# Patient Record
Sex: FEMALE | Race: WHITE | Employment: FULL TIME | URBAN - NONMETROPOLITAN AREA
[De-identification: names, ages, dates, MRNs, and addresses within clinical notes are randomized per-mention and may not be internally consistent; named-entity substitution may affect disease eponyms.]

---

## 2017-02-17 RX ORDER — TERAZOSIN 2 MG/1
2 CAPSULE ORAL DAILY
Qty: 90 CAPSULE | Refills: 3 | Status: SHIPPED | OUTPATIENT
Start: 2017-02-17 | End: 2017-09-12 | Stop reason: DRUGHIGH

## 2017-03-14 ENCOUNTER — OFFICE VISIT (OUTPATIENT)
Dept: NEPHROLOGY | Age: 67
End: 2017-03-14

## 2017-03-14 VITALS
OXYGEN SATURATION: 97 % | BODY MASS INDEX: 22.76 KG/M2 | SYSTOLIC BLOOD PRESSURE: 124 MMHG | WEIGHT: 128.5 LBS | HEART RATE: 69 BPM | DIASTOLIC BLOOD PRESSURE: 82 MMHG

## 2017-03-14 DIAGNOSIS — N18.30 CHRONIC KIDNEY DISEASE, STAGE III (MODERATE) (HCC): Primary | ICD-10-CM

## 2017-03-14 LAB
BACTERIA URINE, POC: NORMAL
BILIRUBIN URINE: NORMAL MG/DL
BLOOD, URINE: NEGATIVE
CASTS URINE, POC: NORMAL
CLARITY: CLEAR
COLOR: YELLOW
CRYSTALS URINE, POC: NORMAL
EPI CELLS URINE, POC: NORMAL
GLUCOSE URINE: NEGATIVE
KETONES, URINE: NEGATIVE
LEUKOCYTE EST, POC: NEGATIVE
NITRITE, URINE: NEGATIVE
PH UA: 5 (ref 4.5–8)
PROTEIN UA: NEGATIVE
RBC URINE, POC: NORMAL
SPECIFIC GRAVITY UA: NORMAL (ref 1–1.03)
UROBILINOGEN, URINE: NORMAL
WBC URINE, POC: NORMAL
YEAST URINE, POC: NORMAL

## 2017-03-14 PROCEDURE — 99213 OFFICE O/P EST LOW 20 MIN: CPT | Performed by: INTERNAL MEDICINE

## 2017-03-14 PROCEDURE — G8400 PT W/DXA NO RESULTS DOC: HCPCS | Performed by: INTERNAL MEDICINE

## 2017-03-14 PROCEDURE — 4040F PNEUMOC VAC/ADMIN/RCVD: CPT | Performed by: INTERNAL MEDICINE

## 2017-03-14 PROCEDURE — G8484 FLU IMMUNIZE NO ADMIN: HCPCS | Performed by: INTERNAL MEDICINE

## 2017-03-14 PROCEDURE — 81002 URINALYSIS NONAUTO W/O SCOPE: CPT | Performed by: INTERNAL MEDICINE

## 2017-03-14 PROCEDURE — 1036F TOBACCO NON-USER: CPT | Performed by: INTERNAL MEDICINE

## 2017-03-14 PROCEDURE — 3014F SCREEN MAMMO DOC REV: CPT | Performed by: INTERNAL MEDICINE

## 2017-03-14 PROCEDURE — G8427 DOCREV CUR MEDS BY ELIG CLIN: HCPCS | Performed by: INTERNAL MEDICINE

## 2017-03-14 PROCEDURE — 1123F ACP DISCUSS/DSCN MKR DOCD: CPT | Performed by: INTERNAL MEDICINE

## 2017-03-14 PROCEDURE — G8419 CALC BMI OUT NRM PARAM NOF/U: HCPCS | Performed by: INTERNAL MEDICINE

## 2017-03-14 PROCEDURE — 3017F COLORECTAL CA SCREEN DOC REV: CPT | Performed by: INTERNAL MEDICINE

## 2017-03-14 PROCEDURE — 1090F PRES/ABSN URINE INCON ASSESS: CPT | Performed by: INTERNAL MEDICINE

## 2017-03-14 RX ORDER — FERROUS SULFATE 325(65) MG
325 TABLET ORAL
COMMUNITY
Start: 2016-12-08

## 2017-03-14 RX ORDER — FUROSEMIDE 40 MG/1
40 TABLET ORAL 2 TIMES DAILY
COMMUNITY
End: 2017-10-02 | Stop reason: SDUPTHER

## 2017-03-14 RX ORDER — LOSARTAN POTASSIUM 25 MG/1
25 TABLET ORAL DAILY
Qty: 100 TABLET | Refills: 3 | Status: SHIPPED | OUTPATIENT
Start: 2017-03-14 | End: 2017-03-29 | Stop reason: SDUPTHER

## 2017-03-14 ASSESSMENT — ENCOUNTER SYMPTOMS
COUGH: 0
CONSTIPATION: 0
CHEST TIGHTNESS: 0
DIARRHEA: 0
BACK PAIN: 0
VOMITING: 0
RESPIRATORY NEGATIVE: 1
ABDOMINAL DISTENTION: 0
NAUSEA: 0
SORE THROAT: 0
BLOOD IN STOOL: 0
SHORTNESS OF BREATH: 0
GASTROINTESTINAL NEGATIVE: 1
ABDOMINAL PAIN: 0
WHEEZING: 0
FACIAL SWELLING: 0
COLOR CHANGE: 0

## 2017-03-31 RX ORDER — LOSARTAN POTASSIUM 50 MG/1
50 TABLET ORAL DAILY
Qty: 30 TABLET | Refills: 3 | OUTPATIENT
Start: 2017-03-31 | End: 2017-04-25 | Stop reason: SDUPTHER

## 2017-03-31 RX ORDER — LOSARTAN POTASSIUM 50 MG/1
50 TABLET ORAL DAILY
COMMUNITY
End: 2017-03-31 | Stop reason: SDUPTHER

## 2017-04-13 LAB
ALBUMIN SERPL-MCNC: 4.5 G/DL
ALP BLD-CCNC: 103 U/L
ALT SERPL-CCNC: 50 U/L
AST SERPL-CCNC: 41 U/L
BASOPHILS ABSOLUTE: ABNORMAL /ΜL
BASOPHILS RELATIVE PERCENT: ABNORMAL %
BILIRUB SERPL-MCNC: 0.5 MG/DL (ref 0.1–1.4)
BUN BLDV-MCNC: 33 MG/DL
CALCIUM SERPL-MCNC: NORMAL MG/DL
CHLORIDE BLD-SCNC: 110 MMOL/L
CO2: 22 MMOL/L
CREAT SERPL-MCNC: 1.57 MG/DL
EOSINOPHILS ABSOLUTE: ABNORMAL /ΜL
EOSINOPHILS RELATIVE PERCENT: ABNORMAL %
GFR CALCULATED: 33
GLUCOSE BLD-MCNC: 95 MG/DL
HCT VFR BLD CALC: 30.8 % (ref 36–46)
HEMOGLOBIN: 10.3 G/DL (ref 12–16)
LYMPHOCYTES ABSOLUTE: ABNORMAL /ΜL
LYMPHOCYTES RELATIVE PERCENT: ABNORMAL %
MCH RBC QN AUTO: ABNORMAL PG
MCHC RBC AUTO-ENTMCNC: ABNORMAL G/DL
MCV RBC AUTO: ABNORMAL FL
MONOCYTES ABSOLUTE: ABNORMAL /ΜL
MONOCYTES RELATIVE PERCENT: ABNORMAL %
NEUTROPHILS ABSOLUTE: ABNORMAL /ΜL
NEUTROPHILS RELATIVE PERCENT: ABNORMAL %
PLATELET # BLD: 188 K/ΜL
PMV BLD AUTO: ABNORMAL FL
POTASSIUM SERPL-SCNC: 3.8 MMOL/L
RBC # BLD: 3.61 10^6/ΜL
SODIUM BLD-SCNC: 140 MMOL/L
TOTAL PROTEIN: NORMAL
WBC # BLD: 6.1 10^3/ML

## 2017-04-20 ENCOUNTER — TELEPHONE (OUTPATIENT)
Dept: NEPHROLOGY | Age: 67
End: 2017-04-20

## 2017-04-25 RX ORDER — LOSARTAN POTASSIUM 50 MG/1
50 TABLET ORAL 2 TIMES DAILY
Qty: 60 TABLET | Refills: 1 | Status: SHIPPED | OUTPATIENT
Start: 2017-04-25 | End: 2017-06-22 | Stop reason: SDUPTHER

## 2017-06-07 ENCOUNTER — TELEPHONE (OUTPATIENT)
Dept: NEPHROLOGY | Age: 67
End: 2017-06-07

## 2017-06-23 RX ORDER — LOSARTAN POTASSIUM 50 MG/1
50 TABLET ORAL 2 TIMES DAILY
Qty: 180 TABLET | Refills: 1 | Status: SHIPPED | OUTPATIENT
Start: 2017-06-23 | End: 2017-10-02 | Stop reason: SDUPTHER

## 2017-08-22 ENCOUNTER — PATIENT MESSAGE (OUTPATIENT)
Dept: NEPHROLOGY | Age: 67
End: 2017-08-22

## 2017-09-12 ENCOUNTER — OFFICE VISIT (OUTPATIENT)
Dept: NEPHROLOGY | Age: 67
End: 2017-09-12
Payer: COMMERCIAL

## 2017-09-12 VITALS
DIASTOLIC BLOOD PRESSURE: 86 MMHG | OXYGEN SATURATION: 98 % | SYSTOLIC BLOOD PRESSURE: 138 MMHG | HEART RATE: 70 BPM | WEIGHT: 129.5 LBS | BODY MASS INDEX: 22.94 KG/M2

## 2017-09-12 DIAGNOSIS — N18.4 CHRONIC KIDNEY DISEASE, STAGE IV (SEVERE) (HCC): Primary | ICD-10-CM

## 2017-09-12 PROCEDURE — G8420 CALC BMI NORM PARAMETERS: HCPCS | Performed by: INTERNAL MEDICINE

## 2017-09-12 PROCEDURE — 1036F TOBACCO NON-USER: CPT | Performed by: INTERNAL MEDICINE

## 2017-09-12 PROCEDURE — G8427 DOCREV CUR MEDS BY ELIG CLIN: HCPCS | Performed by: INTERNAL MEDICINE

## 2017-09-12 PROCEDURE — 3014F SCREEN MAMMO DOC REV: CPT | Performed by: INTERNAL MEDICINE

## 2017-09-12 PROCEDURE — G8400 PT W/DXA NO RESULTS DOC: HCPCS | Performed by: INTERNAL MEDICINE

## 2017-09-12 PROCEDURE — 3017F COLORECTAL CA SCREEN DOC REV: CPT | Performed by: INTERNAL MEDICINE

## 2017-09-12 PROCEDURE — 1123F ACP DISCUSS/DSCN MKR DOCD: CPT | Performed by: INTERNAL MEDICINE

## 2017-09-12 PROCEDURE — 81002 URINALYSIS NONAUTO W/O SCOPE: CPT | Performed by: INTERNAL MEDICINE

## 2017-09-12 PROCEDURE — 4040F PNEUMOC VAC/ADMIN/RCVD: CPT | Performed by: INTERNAL MEDICINE

## 2017-09-12 PROCEDURE — 1090F PRES/ABSN URINE INCON ASSESS: CPT | Performed by: INTERNAL MEDICINE

## 2017-09-12 PROCEDURE — 99213 OFFICE O/P EST LOW 20 MIN: CPT | Performed by: INTERNAL MEDICINE

## 2017-09-12 RX ORDER — TERAZOSIN 2 MG/1
2 CAPSULE ORAL 2 TIMES DAILY
COMMUNITY
End: 2017-10-02 | Stop reason: SDUPTHER

## 2017-09-12 ASSESSMENT — ENCOUNTER SYMPTOMS
COLOR CHANGE: 0
NAUSEA: 0
COUGH: 0
SHORTNESS OF BREATH: 0
WHEEZING: 0
CONSTIPATION: 0
BLOOD IN STOOL: 0
ABDOMINAL PAIN: 0
SORE THROAT: 0
VOMITING: 0
DIARRHEA: 0
ABDOMINAL DISTENTION: 0
GASTROINTESTINAL NEGATIVE: 1
FACIAL SWELLING: 0
RESPIRATORY NEGATIVE: 1
CHEST TIGHTNESS: 0
BACK PAIN: 0

## 2017-10-04 RX ORDER — LOSARTAN POTASSIUM 50 MG/1
50 TABLET ORAL 2 TIMES DAILY
Qty: 180 TABLET | Refills: 1 | Status: SHIPPED | OUTPATIENT
Start: 2017-10-04 | End: 2017-10-26 | Stop reason: ALTCHOICE

## 2017-10-04 RX ORDER — TERAZOSIN 2 MG/1
2 CAPSULE ORAL 2 TIMES DAILY
Qty: 180 CAPSULE | Refills: 1 | Status: SHIPPED | OUTPATIENT
Start: 2017-10-04 | End: 2017-11-29 | Stop reason: SDUPTHER

## 2017-10-04 RX ORDER — SULFAMETHOXAZOLE AND TRIMETHOPRIM 800; 160 MG/1; MG/1
1 TABLET ORAL DAILY
Qty: 90 TABLET | Refills: 1 | Status: SHIPPED | OUTPATIENT
Start: 2017-10-04 | End: 2018-03-26 | Stop reason: SDUPTHER

## 2017-10-04 RX ORDER — FUROSEMIDE 40 MG/1
40 TABLET ORAL 2 TIMES DAILY
Qty: 180 TABLET | Refills: 1 | Status: SHIPPED | OUTPATIENT
Start: 2017-10-04 | End: 2018-03-26 | Stop reason: SDUPTHER

## 2017-10-26 ENCOUNTER — TELEPHONE (OUTPATIENT)
Dept: NEPHROLOGY | Age: 67
End: 2017-10-26

## 2017-11-15 NOTE — TELEPHONE ENCOUNTER
The patient called back-she wanted to double check on the dose of the Terazosin 4 mg bid and stopping the Losartan-I will sent the Amlodipine 2.5 mg daily to [facility] in [place] for now for her

## 2017-11-16 RX ORDER — AMLODIPINE BESYLATE 2.5 MG/1
2.5 TABLET ORAL DAILY
Qty: 30 TABLET | Refills: 2 | Status: SHIPPED | OUTPATIENT
Start: 2017-11-16 | End: 2017-11-16 | Stop reason: SDUPTHER

## 2017-11-16 RX ORDER — AMLODIPINE BESYLATE 2.5 MG/1
2.5 TABLET ORAL DAILY
Qty: 30 TABLET | Refills: 2 | Status: SHIPPED | OUTPATIENT
Start: 2017-11-16 | End: 2018-02-05 | Stop reason: SDUPTHER

## 2017-11-28 ENCOUNTER — PATIENT MESSAGE (OUTPATIENT)
Dept: NEPHROLOGY | Age: 67
End: 2017-11-28

## 2017-11-29 RX ORDER — TERAZOSIN 2 MG/1
4 CAPSULE ORAL 2 TIMES DAILY
Qty: 360 CAPSULE | Refills: 1 | Status: SHIPPED | OUTPATIENT
Start: 2017-11-29 | End: 2017-11-30 | Stop reason: SDUPTHER

## 2017-11-29 NOTE — TELEPHONE ENCOUNTER
From: Andrea Brewer  To: Brenden Bazan DO  Sent: 11/28/2017 5:22 PM EST  Subject: Prescription Question    When my script for terazosin was sent to [facility] mail in October I was only taking 2 mg one time a day. since it has been increased to 4 mg 2 times a day I only have enough for one more week. Would please order the updated script from the mail in pharmacy? The others are good thru December. I am also including the past few days BP with amlodipine. 146/76 , 120/74 ,122/72 ,121/73 ,124/75, 101/68, 133/71, 130/72, 138/73. If you decide to continue this med please order the 90 days from the mail in pharmacy.  Thanks

## 2017-11-30 ENCOUNTER — PATIENT MESSAGE (OUTPATIENT)
Dept: NEPHROLOGY | Age: 67
End: 2017-11-30

## 2017-11-30 RX ORDER — TERAZOSIN 2 MG/1
4 CAPSULE ORAL 2 TIMES DAILY
Qty: 360 CAPSULE | Refills: 1 | Status: SHIPPED | OUTPATIENT
Start: 2017-11-30 | End: 2018-03-13

## 2018-02-05 RX ORDER — AMLODIPINE BESYLATE 2.5 MG/1
2.5 TABLET ORAL DAILY
Qty: 90 TABLET | Refills: 1 | Status: SHIPPED | OUTPATIENT
Start: 2018-02-05 | End: 2018-03-13

## 2018-03-13 ENCOUNTER — OFFICE VISIT (OUTPATIENT)
Dept: NEPHROLOGY | Age: 68
End: 2018-03-13
Payer: MEDICARE

## 2018-03-13 VITALS
SYSTOLIC BLOOD PRESSURE: 122 MMHG | OXYGEN SATURATION: 98 % | BODY MASS INDEX: 21.22 KG/M2 | HEART RATE: 75 BPM | DIASTOLIC BLOOD PRESSURE: 66 MMHG | WEIGHT: 119.8 LBS

## 2018-03-13 DIAGNOSIS — R31.9 URINARY TRACT INFECTION WITH HEMATURIA, SITE UNSPECIFIED: ICD-10-CM

## 2018-03-13 DIAGNOSIS — N18.30 CHRONIC KIDNEY DISEASE, STAGE III (MODERATE) (HCC): Primary | ICD-10-CM

## 2018-03-13 DIAGNOSIS — N39.0 URINARY TRACT INFECTION WITH HEMATURIA, SITE UNSPECIFIED: ICD-10-CM

## 2018-03-13 LAB
BACTERIA URINE, POC: ABNORMAL
BILIRUBIN URINE: ABNORMAL MG/DL
BLOOD, URINE: POSITIVE
CASTS URINE, POC: ABNORMAL
CLARITY: ABNORMAL
COLOR: YELLOW
CRYSTALS URINE, POC: ABNORMAL
EPI CELLS URINE, POC: ABNORMAL
GLUCOSE URINE: NEGATIVE
KETONES, URINE: NEGATIVE
LEUKOCYTE EST, POC: ABNORMAL
NITRITE, URINE: NEGATIVE
PH UA: 5 (ref 4.5–8)
PROTEIN UA: POSITIVE
RBC URINE, POC: ABNORMAL
SPECIFIC GRAVITY UA: ABNORMAL (ref 1–1.03)
UROBILINOGEN, URINE: ABNORMAL
WBC URINE, POC: ABNORMAL
YEAST URINE, POC: ABNORMAL

## 2018-03-13 PROCEDURE — G8427 DOCREV CUR MEDS BY ELIG CLIN: HCPCS | Performed by: INTERNAL MEDICINE

## 2018-03-13 PROCEDURE — 99213 OFFICE O/P EST LOW 20 MIN: CPT | Performed by: INTERNAL MEDICINE

## 2018-03-13 PROCEDURE — 3017F COLORECTAL CA SCREEN DOC REV: CPT | Performed by: INTERNAL MEDICINE

## 2018-03-13 PROCEDURE — 3014F SCREEN MAMMO DOC REV: CPT | Performed by: INTERNAL MEDICINE

## 2018-03-13 PROCEDURE — G8420 CALC BMI NORM PARAMETERS: HCPCS | Performed by: INTERNAL MEDICINE

## 2018-03-13 PROCEDURE — G8484 FLU IMMUNIZE NO ADMIN: HCPCS | Performed by: INTERNAL MEDICINE

## 2018-03-13 PROCEDURE — 81002 URINALYSIS NONAUTO W/O SCOPE: CPT | Performed by: INTERNAL MEDICINE

## 2018-03-13 PROCEDURE — 1123F ACP DISCUSS/DSCN MKR DOCD: CPT | Performed by: INTERNAL MEDICINE

## 2018-03-13 PROCEDURE — 1036F TOBACCO NON-USER: CPT | Performed by: INTERNAL MEDICINE

## 2018-03-13 PROCEDURE — 4040F PNEUMOC VAC/ADMIN/RCVD: CPT | Performed by: INTERNAL MEDICINE

## 2018-03-13 PROCEDURE — G8400 PT W/DXA NO RESULTS DOC: HCPCS | Performed by: INTERNAL MEDICINE

## 2018-03-13 PROCEDURE — 1090F PRES/ABSN URINE INCON ASSESS: CPT | Performed by: INTERNAL MEDICINE

## 2018-03-13 RX ORDER — MYCOPHENOLIC ACID 360 MG/1
180 TABLET, DELAYED RELEASE ORAL 2 TIMES DAILY
COMMUNITY
Start: 2017-12-07

## 2018-03-13 ASSESSMENT — ENCOUNTER SYMPTOMS
BACK PAIN: 0
DIARRHEA: 0
COUGH: 0
COLOR CHANGE: 0
ABDOMINAL PAIN: 0
NAUSEA: 0
CHEST TIGHTNESS: 0
SORE THROAT: 0
VOMITING: 0
CONSTIPATION: 0
BLOOD IN STOOL: 0
RESPIRATORY NEGATIVE: 1
FACIAL SWELLING: 0
WHEEZING: 0
SHORTNESS OF BREATH: 0
GASTROINTESTINAL NEGATIVE: 1
ABDOMINAL DISTENTION: 0

## 2018-03-13 NOTE — PROGRESS NOTES
warm and dry. Nursing note and vitals reviewed. CBC:   Lab Results   Component Value Date    WBC 3.4 (L) 04/21/2017    HGB 10.7 (L) 04/21/2017    HCT 32.3 (L) 04/21/2017    MCV 85.4 04/21/2017     04/21/2017     BMP:    Lab Results   Component Value Date     04/21/2017     04/13/2017     04/13/2017    K 4.0 04/21/2017    K 3.8 04/13/2017    K 3.8 04/13/2017     04/21/2017     04/13/2017     04/13/2017    CO2 24 04/21/2017    CO2 22 04/13/2017    CO2 22 04/13/2017    BUN 24 (H) 04/21/2017    BUN 33 (H) 04/13/2017    BUN 33 04/13/2017    CREATININE 1.48 (H) 04/21/2017    CREATININE 1.57 (H) 04/13/2017    CREATININE 1.57 04/13/2017    GLUCOSE 95 04/13/2017    GLUCOSE 114 (H) 02/24/2017    GLUCOSE 118 (H) 12/16/2016      Hepatic:   Lab Results   Component Value Date    AST 41 04/13/2017    AST 41 04/13/2017    AST 48 (H) 03/23/2017    ALT 50 (H) 04/13/2017    ALT 50 04/13/2017    ALT 50 (H) 03/23/2017    BILITOT 0.5 04/13/2017    BILITOT 0.5 04/13/2017    BILITOT 0.5 03/23/2017    ALKPHOS 103 04/13/2017    ALKPHOS 103 04/13/2017    ALKPHOS 103 03/23/2017     BNP: No results found for: BNP  Lipids:   Lab Results   Component Value Date    CHOL 211 (H) 03/23/2017    HDL 70 12/29/2016     INR: No results found for: INR       URINE: No results found for: Adriana Iyer                            Lab Results   Component Value Date    NITRU Negative 09/12/2017    COLORU Yellow 09/12/2017    PHUR 5.0 09/12/2017    CLARITYU Clear 09/12/2017    LEUKOCYTESUR negative 09/12/2017    BLOODU Negative 09/12/2017    GLUCOSEU negative 09/12/2017    KETUA Negative 09/12/2017         Microalbumen/Creatinine ratio:  No components found for: RUCREAT    Assessment:     1.  Chronic kidney disease, stage III (moderate)  POC URINE with Microscopic   Hypertension controlled  Recent renal bx for DANA no clear cut etiology  Slight bump in BK virus but marked improvement in GFR with switch from cyclosporin

## 2018-03-16 LAB
APPEARANCE: CLEAR
BILIRUBIN URINE: NEGATIVE
COLOR: ABNORMAL
GLUCOSE URINE: NEGATIVE
KETONES, URINE: NEGATIVE
LEUKOCYTES, UA: NEGATIVE
MUCUS: PRESENT
NITRITE, URINE: NEGATIVE
PH, URINE: 5 (ref 5–8)
PROTEIN, URINE: NEGATIVE
RBC URINE: ABNORMAL /HPF
SPECIFIC GRAVITY, URINE: 1.01 (ref 1–1.03)
SQUAMOUS EPITHELIAL: ABNORMAL /HPF
URINALYSIS REFLEX: NO
URINE HGB: NEGATIVE
UROBILINOGEN, URINE: ABNORMAL (ref 0.2–1)
WBC URINE: ABNORMAL /HPF

## 2018-03-26 RX ORDER — SULFAMETHOXAZOLE AND TRIMETHOPRIM 800; 160 MG/1; MG/1
1 TABLET ORAL DAILY
Qty: 90 TABLET | Refills: 1 | Status: SHIPPED | OUTPATIENT
Start: 2018-03-26 | End: 2018-09-12 | Stop reason: SDUPTHER

## 2018-03-26 RX ORDER — FUROSEMIDE 40 MG/1
40 TABLET ORAL 2 TIMES DAILY
Qty: 180 TABLET | Refills: 1 | Status: SHIPPED | OUTPATIENT
Start: 2018-03-26 | End: 2018-09-11 | Stop reason: DRUGHIGH

## 2018-06-01 LAB
ALBUMIN: 4.3
BASOPHILS ABSOLUTE: ABNORMAL /ΜL
BASOPHILS RELATIVE PERCENT: ABNORMAL %
BUN BLDV-MCNC: 29 MG/DL
CALCIUM SERPL-MCNC: 9 MG/DL
CHLORIDE BLD-SCNC: 101 MMOL/L
CHOLESTEROL, TOTAL: 179 MG/DL
CHOLESTEROL/HDL RATIO: NORMAL
CO2: 25 MMOL/L
CREAT SERPL-MCNC: 1.63 MG/DL
EOSINOPHILS ABSOLUTE: ABNORMAL /ΜL
EOSINOPHILS RELATIVE PERCENT: ABNORMAL %
GFR CALCULATED: 31
GLUCOSE BLD-MCNC: 106 MG/DL
HCT VFR BLD CALC: 32.5 % (ref 36–46)
HDLC SERPL-MCNC: NORMAL MG/DL (ref 35–70)
HEMOGLOBIN: 10.5 G/DL (ref 12–16)
LDL CHOLESTEROL CALCULATED: NORMAL MG/DL (ref 0–160)
LYMPHOCYTES ABSOLUTE: ABNORMAL /ΜL
LYMPHOCYTES RELATIVE PERCENT: ABNORMAL %
MAGNESIUM: 2.5 MG/DL
MCH RBC QN AUTO: ABNORMAL PG
MCHC RBC AUTO-ENTMCNC: ABNORMAL G/DL
MCV RBC AUTO: ABNORMAL FL
MONOCYTES ABSOLUTE: ABNORMAL /ΜL
MONOCYTES RELATIVE PERCENT: ABNORMAL %
NEUTROPHILS ABSOLUTE: ABNORMAL /ΜL
NEUTROPHILS RELATIVE PERCENT: ABNORMAL %
PHOSPHORUS: 4 MG/DL
PLATELET # BLD: 271 K/ΜL
PMV BLD AUTO: ABNORMAL FL
POTASSIUM SERPL-SCNC: 2.5 MMOL/L
RBC # BLD: 3.61 10^6/ΜL
SODIUM BLD-SCNC: 138 MMOL/L
TRIGL SERPL-MCNC: 186 MG/DL
URIC ACID: 7.8
VLDLC SERPL CALC-MCNC: NORMAL MG/DL
WBC # BLD: 3.3 10^3/ML

## 2018-06-04 LAB
BUN BLDV-MCNC: 24 MG/DL
CALCIUM SERPL-MCNC: NORMAL MG/DL
CHLORIDE BLD-SCNC: 104 MMOL/L
CO2: 23 MMOL/L
CREAT SERPL-MCNC: 1.55 MG/DL
GFR CALCULATED: 33
GLUCOSE BLD-MCNC: 104 MG/DL
MAGNESIUM: 2.4 MG/DL
POTASSIUM SERPL-SCNC: 3.7 MMOL/L
SODIUM BLD-SCNC: 137 MMOL/L

## 2018-06-28 LAB
BASOPHILS ABSOLUTE: ABNORMAL /ΜL
BASOPHILS RELATIVE PERCENT: ABNORMAL %
BUN BLDV-MCNC: 19 MG/DL
CALCIUM SERPL-MCNC: NORMAL MG/DL
CHLORIDE BLD-SCNC: 104 MMOL/L
CO2: 26 MMOL/L
CREAT SERPL-MCNC: 1.55 MG/DL
EOSINOPHILS ABSOLUTE: ABNORMAL /ΜL
EOSINOPHILS RELATIVE PERCENT: ABNORMAL %
GFR CALCULATED: 33
GLUCOSE BLD-MCNC: 106 MG/DL
HCT VFR BLD CALC: 31.6 % (ref 36–46)
HEMOGLOBIN: 10.3 G/DL (ref 12–16)
LYMPHOCYTES ABSOLUTE: ABNORMAL /ΜL
LYMPHOCYTES RELATIVE PERCENT: ABNORMAL %
MCH RBC QN AUTO: ABNORMAL PG
MCHC RBC AUTO-ENTMCNC: ABNORMAL G/DL
MCV RBC AUTO: ABNORMAL FL
MONOCYTES ABSOLUTE: ABNORMAL /ΜL
MONOCYTES RELATIVE PERCENT: ABNORMAL %
NEUTROPHILS ABSOLUTE: ABNORMAL /ΜL
NEUTROPHILS RELATIVE PERCENT: ABNORMAL %
PLATELET # BLD: 277 K/ΜL
PMV BLD AUTO: ABNORMAL FL
POTASSIUM SERPL-SCNC: 3.6 MMOL/L
RBC # BLD: 3.48 10^6/ΜL
SODIUM BLD-SCNC: 139 MMOL/L
WBC # BLD: 3.6 10^3/ML

## 2018-08-03 LAB
BASOPHILS ABSOLUTE: ABNORMAL /ΜL
BASOPHILS RELATIVE PERCENT: ABNORMAL %
BUN BLDV-MCNC: 23 MG/DL
CALCIUM SERPL-MCNC: NORMAL MG/DL
CHLORIDE BLD-SCNC: 105 MMOL/L
CO2: 23 MMOL/L
CREAT SERPL-MCNC: 1.64 MG/DL
EOSINOPHILS ABSOLUTE: ABNORMAL /ΜL
EOSINOPHILS RELATIVE PERCENT: ABNORMAL %
GFR CALCULATED: 31
GLUCOSE BLD-MCNC: 100 MG/DL
HCT VFR BLD CALC: 30.6 % (ref 36–46)
HEMOGLOBIN: 10 G/DL (ref 12–16)
LYMPHOCYTES ABSOLUTE: ABNORMAL /ΜL
LYMPHOCYTES RELATIVE PERCENT: ABNORMAL %
MCH RBC QN AUTO: ABNORMAL PG
MCHC RBC AUTO-ENTMCNC: ABNORMAL G/DL
MCV RBC AUTO: ABNORMAL FL
MONOCYTES ABSOLUTE: ABNORMAL /ΜL
MONOCYTES RELATIVE PERCENT: ABNORMAL %
NEUTROPHILS ABSOLUTE: ABNORMAL /ΜL
NEUTROPHILS RELATIVE PERCENT: ABNORMAL %
PLATELET # BLD: 301 K/ΜL
PMV BLD AUTO: ABNORMAL FL
POTASSIUM SERPL-SCNC: 2.8 MMOL/L
RBC # BLD: 3.32 10^6/ΜL
SODIUM BLD-SCNC: 138 MMOL/L
WBC # BLD: 3.8 10^3/ML

## 2018-08-31 LAB
BASOPHILS ABSOLUTE: ABNORMAL /ΜL
BASOPHILS RELATIVE PERCENT: ABNORMAL %
BUN BLDV-MCNC: 22 MG/DL
CALCIUM SERPL-MCNC: NORMAL MG/DL
CHLORIDE BLD-SCNC: 107 MMOL/L
CO2: 20 MMOL/L
CREAT SERPL-MCNC: 1.56 MG/DL
EOSINOPHILS ABSOLUTE: ABNORMAL /ΜL
EOSINOPHILS RELATIVE PERCENT: ABNORMAL %
GFR CALCULATED: 33
GLUCOSE BLD-MCNC: 107 MG/DL
HCT VFR BLD CALC: 30.4 % (ref 36–46)
HEMOGLOBIN: 9.9 G/DL (ref 12–16)
LYMPHOCYTES ABSOLUTE: ABNORMAL /ΜL
LYMPHOCYTES RELATIVE PERCENT: ABNORMAL %
MCH RBC QN AUTO: ABNORMAL PG
MCHC RBC AUTO-ENTMCNC: ABNORMAL G/DL
MCV RBC AUTO: ABNORMAL FL
MONOCYTES ABSOLUTE: ABNORMAL /ΜL
MONOCYTES RELATIVE PERCENT: ABNORMAL %
NEUTROPHILS ABSOLUTE: ABNORMAL /ΜL
NEUTROPHILS RELATIVE PERCENT: ABNORMAL %
PLATELET # BLD: 290 K/ΜL
PMV BLD AUTO: ABNORMAL FL
POTASSIUM SERPL-SCNC: 3.1 MMOL/L
RBC # BLD: 3.26 10^6/ΜL
SODIUM BLD-SCNC: 137 MMOL/L
WBC # BLD: 3.2 10^3/ML

## 2018-09-11 ENCOUNTER — OFFICE VISIT (OUTPATIENT)
Dept: NEPHROLOGY | Age: 68
End: 2018-09-11
Payer: MEDICARE

## 2018-09-11 VITALS
OXYGEN SATURATION: 99 % | BODY MASS INDEX: 19.2 KG/M2 | DIASTOLIC BLOOD PRESSURE: 72 MMHG | SYSTOLIC BLOOD PRESSURE: 118 MMHG | HEART RATE: 71 BPM | WEIGHT: 108.4 LBS

## 2018-09-11 DIAGNOSIS — E87.6 HYPOKALEMIA: ICD-10-CM

## 2018-09-11 DIAGNOSIS — N18.30 ANEMIA IN STAGE 3 CHRONIC KIDNEY DISEASE (HCC): ICD-10-CM

## 2018-09-11 DIAGNOSIS — N18.30 CKD (CHRONIC KIDNEY DISEASE), STAGE III (HCC): ICD-10-CM

## 2018-09-11 DIAGNOSIS — D84.9 IMMUNOCOMPROMISED (HCC): ICD-10-CM

## 2018-09-11 DIAGNOSIS — D63.1 ANEMIA IN STAGE 3 CHRONIC KIDNEY DISEASE (HCC): ICD-10-CM

## 2018-09-11 DIAGNOSIS — I10 ESSENTIAL HYPERTENSION: ICD-10-CM

## 2018-09-11 DIAGNOSIS — Z94.0 HISTORY OF KIDNEY TRANSPLANT: Primary | ICD-10-CM

## 2018-09-11 LAB
BACTERIA URINE, POC: NORMAL
BILIRUBIN URINE: NORMAL MG/DL
BLOOD, URINE: NEGATIVE
CASTS URINE, POC: NORMAL
CLARITY: CLEAR
COLOR: NORMAL
CRYSTALS URINE, POC: NORMAL
EPI CELLS URINE, POC: NORMAL
GLUCOSE URINE: NEGATIVE
KETONES, URINE: NEGATIVE
LEUKOCYTE EST, POC: NORMAL
NITRITE, URINE: NEGATIVE
PH UA: 5 (ref 4.5–8)
PROTEIN UA: NEGATIVE
RBC URINE, POC: NORMAL
SPECIFIC GRAVITY UA: 1.02 (ref 1–1.03)
UROBILINOGEN, URINE: NORMAL
WBC URINE, POC: NORMAL
YEAST URINE, POC: NORMAL

## 2018-09-11 PROCEDURE — 99214 OFFICE O/P EST MOD 30 MIN: CPT | Performed by: INTERNAL MEDICINE

## 2018-09-11 PROCEDURE — 81000 URINALYSIS NONAUTO W/SCOPE: CPT | Performed by: INTERNAL MEDICINE

## 2018-09-11 RX ORDER — FUROSEMIDE 40 MG/1
40 TABLET ORAL DAILY
Qty: 180 TABLET | Refills: 1 | Status: SHIPPED
Start: 2018-09-11 | End: 2018-09-12 | Stop reason: DRUGHIGH

## 2018-09-11 RX ORDER — POTASSIUM CHLORIDE 750 MG/1
10 TABLET, EXTENDED RELEASE ORAL DAILY
COMMUNITY
Start: 2018-06-29

## 2018-09-11 NOTE — PROGRESS NOTES
[facility]    [facility], [address]   [place] [facility]  [phone]  Progress Note  9/11/2018 10:46 AM      Pt Name:    Kg Fenton  Birthdate:    [birth year]  Primary Care Physician:  Ronni Madera DO     Chief Complaint:   Kidney transplant  CKD 3     Background Information/Interval History: This is a follow-up visit for post-renal transplant. 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 At that time her Cya was switched to Myfortic and she currently maintains on  EVL and Myfortic. Her baseline Cr is 1.4-1.8. The patient was last seen in our clinic in March 2018 and at [facility] in May 2018. Since the last visit the patient has lost 11 pounds. She states that sometimes her appetite isn't great and she will only eat broth some days. She states she has been more active since retiring and thinks that may be related to weight loss. She has chronic diarrhea from her medications about once daily or every other day, this is unchanged in frequency. The patient denies any fevers/chills/nausea/vomiting/chest pain/shortness of breath/gross hematuria/flank pain/edema. A comprehensive review of systems was negative except for: sleeplessness, diarrhea, weight loss.          Past History:  Past Medical History:   Diagnosis Date    Hypertension     Kidney disease     genetic, polycystic     Past Surgical History:   Procedure Laterality Date    ANTERIOR CRUCIATE LIGAMENT REPAIR Right 1998 [place]    CARPAL TUNNEL RELEASE Right 7/18/13    Dr. Sue De La Cruz  8/11/2015    LAPAROSCOPIC VENTRAL HERNIA REPAIR WITH MESH--GILBERTO    OTHER SURGICAL HISTORY  2000    weight loss [place]     UPPER GASTROINTESTINAL ENDOSCOPY

## 2018-12-18 DIAGNOSIS — Z94.0 HISTORY OF KIDNEY TRANSPLANT: Primary | ICD-10-CM

## 2018-12-18 RX ORDER — SULFAMETHOXAZOLE AND TRIMETHOPRIM 400; 80 MG/1; MG/1
1 TABLET ORAL DAILY
Qty: 90 TABLET | Refills: 2 | Status: SHIPPED | OUTPATIENT
Start: 2018-12-18 | End: 2018-12-18

## 2018-12-18 RX ORDER — SULFAMETHOXAZOLE AND TRIMETHOPRIM 400; 80 MG/1; MG/1
1 TABLET ORAL DAILY
Qty: 90 TABLET | Refills: 2 | Status: SHIPPED | OUTPATIENT
Start: 2018-12-18 | End: 2019-03-18

## 2019-01-21 RX ORDER — FUROSEMIDE 40 MG/1
40 TABLET ORAL 2 TIMES DAILY
Qty: 180 TABLET | Refills: 3 | Status: SHIPPED | OUTPATIENT
Start: 2019-01-21 | End: 2019-03-06 | Stop reason: SDUPTHER

## 2019-03-06 RX ORDER — FUROSEMIDE 40 MG/1
TABLET ORAL
Qty: 90 TABLET | Refills: 1 | Status: SHIPPED | OUTPATIENT
Start: 2019-03-06

## 2019-04-17 ENCOUNTER — OFFICE VISIT (OUTPATIENT)
Dept: NEPHROLOGY | Age: 69
End: 2019-04-17
Payer: MEDICARE

## 2019-04-17 VITALS
SYSTOLIC BLOOD PRESSURE: 152 MMHG | HEIGHT: 63 IN | DIASTOLIC BLOOD PRESSURE: 80 MMHG | BODY MASS INDEX: 20.66 KG/M2 | WEIGHT: 116.6 LBS

## 2019-04-17 DIAGNOSIS — D63.1 ANEMIA IN STAGE 3 CHRONIC KIDNEY DISEASE (HCC): Primary | ICD-10-CM

## 2019-04-17 DIAGNOSIS — I10 ESSENTIAL HYPERTENSION: ICD-10-CM

## 2019-04-17 DIAGNOSIS — N18.30 ANEMIA IN STAGE 3 CHRONIC KIDNEY DISEASE (HCC): Primary | ICD-10-CM

## 2019-04-17 DIAGNOSIS — N18.30 CKD (CHRONIC KIDNEY DISEASE), STAGE III (HCC): ICD-10-CM

## 2019-04-17 DIAGNOSIS — Z94.0 HISTORY OF KIDNEY TRANSPLANT: ICD-10-CM

## 2019-04-17 PROCEDURE — 1090F PRES/ABSN URINE INCON ASSESS: CPT | Performed by: INTERNAL MEDICINE

## 2019-04-17 PROCEDURE — 1036F TOBACCO NON-USER: CPT | Performed by: INTERNAL MEDICINE

## 2019-04-17 PROCEDURE — 99213 OFFICE O/P EST LOW 20 MIN: CPT | Performed by: INTERNAL MEDICINE

## 2019-04-17 PROCEDURE — 4040F PNEUMOC VAC/ADMIN/RCVD: CPT | Performed by: INTERNAL MEDICINE

## 2019-04-17 PROCEDURE — G8427 DOCREV CUR MEDS BY ELIG CLIN: HCPCS | Performed by: INTERNAL MEDICINE

## 2019-04-17 PROCEDURE — G8420 CALC BMI NORM PARAMETERS: HCPCS | Performed by: INTERNAL MEDICINE

## 2019-04-17 PROCEDURE — G8400 PT W/DXA NO RESULTS DOC: HCPCS | Performed by: INTERNAL MEDICINE

## 2019-04-17 PROCEDURE — 1123F ACP DISCUSS/DSCN MKR DOCD: CPT | Performed by: INTERNAL MEDICINE

## 2019-04-17 PROCEDURE — 3017F COLORECTAL CA SCREEN DOC REV: CPT | Performed by: INTERNAL MEDICINE

## 2019-04-17 RX ORDER — PANTOPRAZOLE SODIUM 40 MG/1
40 TABLET, DELAYED RELEASE ORAL 2 TIMES DAILY
COMMUNITY
Start: 2019-03-24

## 2019-04-17 RX ORDER — SULFAMETHOXAZOLE AND TRIMETHOPRIM 800; 160 MG/1; MG/1
1 TABLET ORAL DAILY
COMMUNITY
Start: 2018-12-18 | End: 2019-01-01

## 2019-04-17 RX ORDER — EVEROLIMUS TABLETS 0.25 MG/1
0.5 TABLET ORAL DAILY
COMMUNITY
Start: 2018-12-12

## 2019-04-17 RX ORDER — CALCITRIOL 0.5 UG/1
0.25 CAPSULE, LIQUID FILLED ORAL DAILY
Refills: 3 | COMMUNITY
Start: 2019-03-29 | End: 2019-10-16 | Stop reason: SDUPTHER

## 2019-04-17 NOTE — PROGRESS NOTES
[facility]    [facility], [address]   [place] [facility]  [phone]  Progress Note  4/17/2019 2:20 PM      Pt Name:    Nga Hamilton  Birthdate:    [birth year]  Primary Care Physician:  Isidro Murry DO     Chief Complaint:   Kidney transplant  CKD 3     Background Information/Interval History: This is a follow-up visit for post-renal transplant. She was last seen in clinic 6 months ago. Since the last visit she had a GI bleed in March and was hospitalized at [facility] she had 2 blood transfusions. EGD showed nonbleeding jejunal ulcers. She states she is feeling much better now. No further bleeding. Hgb most recently 8.7. She is having a colonoscopy next week and repeating bloodwork at end of April. Her BP is running high today, she checks it every day at home and is usually [id number] systolic. She takes Lasix for her blood pressure. She has recently been started on Protonix as well as Calcitriol. 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 At that time her Cya was switched to Myfortic and she currently maintains on  EVL and Myfortic. Her baseline Cr is 1.4-1.8. ROS negative unless listed in the HPI.           Past History:  Past Medical History:   Diagnosis Date    Hypertension     Kidney disease     genetic, polycystic     Past Surgical History:   Procedure Laterality Date    ANTERIOR CRUCIATE LIGAMENT REPAIR Right 1998 [place]    CARPAL TUNNEL RELEASE Right 7/18/13    Dr. Hayley Fraga  8/11/2015    LAPAROSCOPIC VENTRAL HERNIA REPAIR WITH MESH--GILBERTO    OTHER SURGICAL HISTORY  2000    weight loss [place]     UPPER GASTROINTESTINAL ENDOSCOPY  9/2012    Tressa CALDWELL         VITALS:  BP (!) 152/80 08/31/2018     08/03/2018     06/28/2018    K 3.1 08/31/2018    K 2.8 08/03/2018    K 3.6 06/28/2018     08/31/2018     08/03/2018     06/28/2018    CO2 20 08/31/2018    CO2 23 08/03/2018    CO2 26 06/28/2018    BUN 22 08/31/2018    BUN 23 08/03/2018    BUN 19 06/28/2018    CREATININE 1.56 08/31/2018    CREATININE 1.64 08/03/2018    CREATININE 1.55 06/28/2018    GLUCOSE 107 08/31/2018    GLUCOSE 100 08/03/2018    GLUCOSE 106 06/28/2018      Hepatic:   Lab Results   Component Value Date    AST 41 04/13/2017    AST 41 04/13/2017    AST 48 (H) 03/23/2017    ALT 50 (H) 04/13/2017    ALT 50 04/13/2017    ALT 50 (H) 03/23/2017    BILITOT 0.5 04/13/2017    BILITOT 0.5 04/13/2017    BILITOT 0.5 03/23/2017    ALKPHOS 103 04/13/2017    ALKPHOS 103 04/13/2017    ALKPHOS 103 03/23/2017     BNP: No results found for: BNP  Lipids:   Lab Results   Component Value Date    CHOL 179 06/01/2018    HDL 70 12/29/2016     INR: No results found for: INR  URINE:   Lab Results   Component Value Date    PROTUR Negative 03/16/2018     Lab Results   Component Value Date    NITRU Negative 09/11/2018    COLORU Light Yellow 09/11/2018    PHUR 5.0 09/11/2018    WBCUA 0-5 03/16/2018    RBCUA 3-5 03/16/2018    MUCUS Present 03/16/2018    CLARITYU Clear 09/11/2018    SPECGRAV 1.025 09/11/2018    LEUKOCYTESUR moderate 09/11/2018    UROBILINOGEN Normal 09/11/2018    BILIRUBINUR Negative 03/16/2018    BLOODU Negative 09/11/2018    GLUCOSEU negative 09/11/2018    KETUA Negative 09/11/2018      Microalbumen/Creatinine ratio:  No components found for: RUCREAT        Impression/Plan:   1. CKD 3 - stable. Baseline Cr 1.4-1.8.  -no proteinuria     2. ESRD from hereditary nephritis and PKD, s/p living unrelated kidney [id number] at [facility]. 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 Cr peaked at 2.4. At that time cyclosporine was switched to Myfortic. Currently on EVL 1.25 mg BID and Myfortic 720 mg BID. 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 -remains on Bactrim for PCP prophylaxis  -continue annual derm skin evaluation and wearing sunscrean  -not on statin, lipid panel within goal    3. History GI bleed due to jejunal ulcers -check repeat CBC and iron studies end of month, may need CRISTOPHER. Has colonoscopy next week. 4. HTN - elevated today but home Bps are controlled  5. 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 Return to clinic in 6 months.       Electronically signed by Reid Howe DO on 9/11/18 at 10:46 AM    [facility]

## 2019-06-04 ENCOUNTER — TELEPHONE (OUTPATIENT)
Dept: NEPHROLOGY | Age: 69
End: 2019-06-04

## 2019-06-04 NOTE — TELEPHONE ENCOUNTER
----- Message from Amna Jack DO sent at 6/3/2019  5:08 PM EDT -----  Potassium levels are low - have her increase potassium to BID and she can increase potassium in diet too.

## 2019-06-05 NOTE — TELEPHONE ENCOUNTER
I called patient and informed her of this information-she is in [place] until August and she will inform her nephrologist there

## 2019-08-30 LAB
BUN BLDV-MCNC: 31 MG/DL
CALCIUM SERPL-MCNC: NORMAL MG/DL
CHLORIDE BLD-SCNC: 107 MMOL/L
CO2: 107 MMOL/L
CREAT SERPL-MCNC: 1.5 MG/DL
GFR CALCULATED: 34
GLUCOSE BLD-MCNC: 104 MG/DL
POTASSIUM SERPL-SCNC: 4.2 MMOL/L
SODIUM BLD-SCNC: 142 MMOL/L

## 2019-10-14 LAB
ABSOLUTE BASO #: 0 /CMM (ref 0–200)
ABSOLUTE EOS #: 100 /CMM (ref 0–500)
ABSOLUTE LYMPH #: 800 /CMM (ref 1000–4800)
ABSOLUTE MONO #: 500 /CMM (ref 0–800)
ABSOLUTE NEUT #: 2400 /CMM (ref 1800–7700)
ANION GAP SERPL CALCULATED.3IONS-SCNC: 8 MMOL/L (ref 4–12)
BASOPHILS RELATIVE PERCENT: 0.9 % (ref 0–2)
BUN BLDV-MCNC: 26 MG/DL (ref 7–20)
CALCIUM SERPL-MCNC: 9.3 MG/DL (ref 8.8–10.5)
CHLORIDE BLD-SCNC: 106 MEQ/L (ref 101–111)
CO2: 28 MEQ/L (ref 21–32)
CREAT SERPL-MCNC: 1.65 MG/DL (ref 0.6–1.3)
CREATININE CLEARANCE: 31
EOSINOPHILS RELATIVE PERCENT: 1.6 % (ref 0–6)
GLUCOSE: 104 MG/DL (ref 70–110)
HCT VFR BLD CALC: 35 % (ref 35–44)
HEMOGLOBIN: 11.4 GM/DL (ref 12–15)
IRON SATURATION: 23 % (ref 15–50)
IRON, SERUM: 69 MCG/DL (ref 28–170)
LYMPHOCYTES RELATIVE PERCENT: 22 % (ref 15–45)
MCH RBC QN AUTO: 28.1 PG (ref 27.5–33)
MCHC RBC AUTO-ENTMCNC: 32.5 GM/DL (ref 33–36)
MCV RBC AUTO: 86.5 CU MIC (ref 80–97)
MONOCYTES RELATIVE PERCENT: 13.2 % (ref 2–10)
NEUTROPHILS RELATIVE PERCENT: 62.3 % (ref 40–70)
NUCLEATED RBCS: 0.1 /100 WBC
PARATHYROID HORMONE INTACT: 217.6 U/ML (ref 12–88)
PDW BLD-RTO: 13.8 % (ref 12–16)
PLATELET # BLD: 250 TH/CMM (ref 150–400)
POTASSIUM SERPL-SCNC: 4.1 MEQ/L (ref 3.6–5)
RBC # BLD: 4.05 MIL/CMM (ref 4–5.1)
SODIUM BLD-SCNC: 142 MEQ/L (ref 135–145)
TRANSFERRIN: 206 MG/DL (ref 192–382)
VITAMIN D 25-HYDROXY: 49.6 NG/ML (ref 30–100)
WBC # BLD: 3.8 TH/CMM (ref 4.4–10.5)

## 2019-10-16 ENCOUNTER — OFFICE VISIT (OUTPATIENT)
Dept: NEPHROLOGY | Age: 69
End: 2019-10-16
Payer: MEDICARE

## 2019-10-16 VITALS
HEART RATE: 70 BPM | SYSTOLIC BLOOD PRESSURE: 152 MMHG | BODY MASS INDEX: 20.19 KG/M2 | DIASTOLIC BLOOD PRESSURE: 78 MMHG | WEIGHT: 114 LBS | OXYGEN SATURATION: 100 %

## 2019-10-16 DIAGNOSIS — N25.81 SECONDARY HYPERPARATHYROIDISM OF RENAL ORIGIN (HCC): ICD-10-CM

## 2019-10-16 DIAGNOSIS — Z94.0 HISTORY OF KIDNEY TRANSPLANT: ICD-10-CM

## 2019-10-16 DIAGNOSIS — N18.30 CKD (CHRONIC KIDNEY DISEASE), STAGE III (HCC): Primary | ICD-10-CM

## 2019-10-16 DIAGNOSIS — I10 ESSENTIAL HYPERTENSION: ICD-10-CM

## 2019-10-16 PROCEDURE — G8420 CALC BMI NORM PARAMETERS: HCPCS | Performed by: INTERNAL MEDICINE

## 2019-10-16 PROCEDURE — 1123F ACP DISCUSS/DSCN MKR DOCD: CPT | Performed by: INTERNAL MEDICINE

## 2019-10-16 PROCEDURE — 3017F COLORECTAL CA SCREEN DOC REV: CPT | Performed by: INTERNAL MEDICINE

## 2019-10-16 PROCEDURE — 99213 OFFICE O/P EST LOW 20 MIN: CPT | Performed by: INTERNAL MEDICINE

## 2019-10-16 PROCEDURE — G8427 DOCREV CUR MEDS BY ELIG CLIN: HCPCS | Performed by: INTERNAL MEDICINE

## 2019-10-16 PROCEDURE — 1090F PRES/ABSN URINE INCON ASSESS: CPT | Performed by: INTERNAL MEDICINE

## 2019-10-16 PROCEDURE — G8484 FLU IMMUNIZE NO ADMIN: HCPCS | Performed by: INTERNAL MEDICINE

## 2019-10-16 PROCEDURE — 4040F PNEUMOC VAC/ADMIN/RCVD: CPT | Performed by: INTERNAL MEDICINE

## 2019-10-16 PROCEDURE — 1036F TOBACCO NON-USER: CPT | Performed by: INTERNAL MEDICINE

## 2019-10-16 PROCEDURE — G8400 PT W/DXA NO RESULTS DOC: HCPCS | Performed by: INTERNAL MEDICINE

## 2019-10-16 RX ORDER — CALCITRIOL 0.5 UG/1
0.5 CAPSULE, LIQUID FILLED ORAL DAILY
Qty: 30 CAPSULE | Refills: 3
Start: 2019-10-16

## 2020-01-01 ENCOUNTER — TELEPHONE (OUTPATIENT)
Dept: NEPHROLOGY | Age: 70
End: 2020-01-01

## 2020-01-01 ENCOUNTER — OFFICE VISIT (OUTPATIENT)
Dept: NEPHROLOGY | Age: 70
End: 2020-01-01
Payer: MEDICARE

## 2020-01-01 VITALS
WEIGHT: 118.2 LBS | OXYGEN SATURATION: 97 % | TEMPERATURE: 98.1 F | BODY MASS INDEX: 20.94 KG/M2 | DIASTOLIC BLOOD PRESSURE: 95 MMHG | SYSTOLIC BLOOD PRESSURE: 166 MMHG | HEART RATE: 70 BPM

## 2020-01-01 LAB
BUN BLDV-MCNC: 27 MG/DL
CALCIUM SERPL-MCNC: NORMAL MG/DL
CHLORIDE BLD-SCNC: 105 MMOL/L
CO2: 26 MMOL/L
CREAT SERPL-MCNC: 1.97 MG/DL
GFR CALCULATED: 25
GLUCOSE BLD-MCNC: 101 MG/DL
POTASSIUM SERPL-SCNC: 3.8 MMOL/L
SODIUM BLD-SCNC: 139 MMOL/L

## 2020-01-01 PROCEDURE — G8420 CALC BMI NORM PARAMETERS: HCPCS | Performed by: INTERNAL MEDICINE

## 2020-01-01 PROCEDURE — G8427 DOCREV CUR MEDS BY ELIG CLIN: HCPCS | Performed by: INTERNAL MEDICINE

## 2020-01-01 PROCEDURE — 4040F PNEUMOC VAC/ADMIN/RCVD: CPT | Performed by: INTERNAL MEDICINE

## 2020-01-01 PROCEDURE — G8400 PT W/DXA NO RESULTS DOC: HCPCS | Performed by: INTERNAL MEDICINE

## 2020-01-01 PROCEDURE — 1090F PRES/ABSN URINE INCON ASSESS: CPT | Performed by: INTERNAL MEDICINE

## 2020-01-01 PROCEDURE — 99213 OFFICE O/P EST LOW 20 MIN: CPT | Performed by: INTERNAL MEDICINE

## 2020-01-01 PROCEDURE — 1036F TOBACCO NON-USER: CPT | Performed by: INTERNAL MEDICINE

## 2020-01-01 PROCEDURE — 3017F COLORECTAL CA SCREEN DOC REV: CPT | Performed by: INTERNAL MEDICINE

## 2020-01-01 PROCEDURE — 1123F ACP DISCUSS/DSCN MKR DOCD: CPT | Performed by: INTERNAL MEDICINE

## 2020-09-23 NOTE — PROGRESS NOTES
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 Patient didn't bring med list and stated she will do your blood work soon, she had trouble getting to lab since she has trouble with the right eye. bp is elevated, pt is nervous about appt.

## 2020-09-23 NOTE — PROGRESS NOTES
[facility]    [facility], [address] [address]   [place], [address]  [phone]  Progress Note  9/23/2020 2:20 PM      Pt Name:    Alberto Gomez  Birthdate:    [birth year]  Primary Care Physician:  Jenny Benson DO     Chief Complaint:   Kidney transplant  CKD 3     Background Information/Interval History: This is a follow-up visit for post-renal transplant. She had loss of vision and had \"mini stroke\" in July. Diagnosed with central retinal artery occlusion. She is having carotid US and 2d echo in October. Now taking aspirin 81 mg daily. Creatinine has worsened up to 1.9 and plasma BK increasing again. Myfortic was decreased to 180 mg BID. Levels being drawn weekly and are decreasing. BP high here always but is controlled at home. Transplant History:  - ESRD from hereditary nephritis and PKD, s/p living unrelated kidney transplant 10/26/16 at [facility].   (part of paired kidney exchange program)   -post-transplant course complicated by cycosporine nephrotoxicity as documented by patchy interstitial fibrosis noted on renal biopsy October 2017. At that time her Cya was switched to Myfortic and she currently maintains on  EVL and Myfortic.        -Hx of BK recently worsened  -Hx of GI bleed    ROS negative unless listed in the HPI.           Past History:  Past Medical History:   Diagnosis Date    Hypertension     Kidney disease     genetic, polycystic     Past Surgical History:   Procedure Laterality Date    ANTERIOR CRUCIATE LIGAMENT REPAIR Right 1998 [place]    CARPAL TUNNEL RELEASE Right 7/18/13    Dr. Samira Durham  8/11/2015    LAPAROSCOPIC VENTRAL HERNIA REPAIR WITH MESH--GILBERTO    OTHER SURGICAL HISTORY  2000    weight loss [place]     UPPER GASTROINTESTINAL ENDOSCOPY  9/2012    Tressa CALDWELL         VITALS:  BP (!) 166/95 (Site: Right Upper Arm, Position: Sitting, Cuff Size: Small Adult)   Pulse 70   Temp 98.1 °F (36.7 °C) Wt 118 lb 3.2 oz (53.6 kg)   SpO2 97%   BMI 20.94 kg/m²   Wt Readings from Last 3 Encounters:   09/23/20 118 lb 3.2 oz (53.6 kg)   10/16/19 114 lb (51.7 kg)   04/17/19 116 lb 9.6 oz (52.9 kg)     Body mass index is 20.94 kg/m². General Appearance: alert and cooperative with exam, appears comfortable, no distress, thin  HEENT: EOMI, moist oral mucus membranes  Neck: No jugular venous distention, no carotid bruit,  Lungs: Air entry B/L, no crackles or rales, no use of accessory muscles  Heart: S1, S2 heard, no rub  GI: soft, non-tender, no guarding,   Extremities: No sig LE edema, no cyanosis  Skin: warm, dry  Neurologic: no tremor, no asterixis, no focal neurologic deficits     Medications:  Current Outpatient Medications   Medication Sig Dispense Refill    calcitRIOL (ROCALTROL) 0.5 MCG capsule Take 1 capsule by mouth daily 30 capsule 3    pantoprazole (PROTONIX) 40 MG tablet Take 40 mg by mouth 2 times daily      Everolimus (ZORTRESS) 0.25 MG TABS Take 0.5 mg by mouth daily 4 tablets in am  3 tablets in pm      furosemide (LASIX) 40 MG tablet TAKE 1 TABLET DAILY 90 tablet 1    potassium chloride (KLOR-CON M) 10 MEQ extended release tablet Take 10 mEq by mouth daily       Mycophenolate Sodium (MYFORTIC) 360 MG TBEC Take 180 mg by mouth 2 times daily       ferrous sulfate 325 (65 FE) MG tablet Take 325 mg by mouth 3 times daily (with meals)        No current facility-administered medications for this visit.          Laboratory & Diagnostics:  CBC:   Lab Results   Component Value Date    WBC 4.9 09/21/2020    HGB 10.3 (L) 09/21/2020    HCT 32.0 (L) 09/21/2020    MCV 91.7 09/21/2020     09/21/2020     BMP:    Lab Results   Component Value Date     09/21/2020     08/21/2020     06/30/2020    K 4.2 09/21/2020    K 3.9 08/21/2020    K 3.7 06/30/2020     09/21/2020     06/30/2020     05/28/2020    CO2 27 09/21/2020    CO2 24 08/21/2020    CO2 25 06/30/2020    BUN 28 (H) 09/21/2020    BUN 21 (H) 06/30/2020    BUN 25 (H) 05/28/2020    CREATININE 1.91 (H) 09/21/2020    CREATININE 1.91 (H) 08/21/2020    CREATININE 1.66 (H) 06/30/2020    GLUCOSE 94 09/21/2020    GLUCOSE 110 08/21/2020    GLUCOSE 93 06/30/2020      Hepatic:   Lab Results   Component Value Date    AST 17 08/21/2020    AST 20 02/28/2020    AST 22 10/01/2019    ALT 11 08/21/2020    ALT 16 02/28/2020    ALT 19 10/01/2019    BILITOT 0.2 08/21/2020    BILITOT 0.3 02/28/2020    BILITOT 0.5 04/13/2017    ALKPHOS 74 08/21/2020    ALKPHOS 76 02/28/2020    ALKPHOS 57 10/01/2019     BNP: No results found for: BNP  Lipids:   Lab Results   Component Value Date    CHOL 199 08/21/2020    HDL 34 (L) 08/21/2020     INR: No results found for: INR  URINE:   Lab Results   Component Value Date    PROTUR Negative 03/16/2018     Lab Results   Component Value Date    NITRU Negative 09/11/2018    COLORU Light Yellow 09/11/2018    PHUR 5.0 09/11/2018    WBCUA 0-5 03/16/2018    RBCUA 3-5 03/16/2018    MUCUS Present 03/16/2018    CLARITYU Clear 09/11/2018    SPECGRAV 1.025 09/11/2018    LEUKOCYTESUR moderate 09/11/2018    UROBILINOGEN Normal 09/11/2018    BILIRUBINUR Negative 03/16/2018    BLOODU Negative 09/11/2018    GLUCOSEU negative 09/11/2018    KETUA Negative 09/11/2018      Microalbumen/Creatinine ratio:  No components found for: RUCREAT        Impression/Plan:   1. 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 Myfortic was reduced to 180 mg BID, checking levels weekly  -no proteinuria     2. ESRD from hereditary nephritis and PKD, s/p living unrelated kidney [id number] at [address] [place] [address]. -  Currently on EVL and Myfortic.  -continue annual derm skin evaluation and wearing sunscrean  3. BK viremia: myfortic reduced to 180 mg BID, checking weekly levels, f/u with [facility]    4. Anemia - stable  5. HTN - white coat HTN, controlled at home  6. Hx gastric bypass  7.  CKD-MBD:PTH improved, continue Calcitriol 0.5 mcg daily  8. Central retinal artery occlusion: having carotid US and 2D echo. On aspirin 81 mg    Bloodwork and medications were reviewed and plan of care discussed with the patient. Return to clinic in 6 months.       Electronically signed by Nina Staley DO on 9/11/18 at 10:46 AM    [facility]

## 2020-12-08 NOTE — TELEPHONE ENCOUNTER
----- Message from Talya Lynn DO sent at 12/8/2020  3:49 PM EST -----  Patient's creatinine is up to 2.9. is she feeling ok?   Please inform to increase her water intake